# Patient Record
Sex: MALE | Employment: FULL TIME | URBAN - METROPOLITAN AREA
[De-identification: names, ages, dates, MRNs, and addresses within clinical notes are randomized per-mention and may not be internally consistent; named-entity substitution may affect disease eponyms.]

---

## 2019-01-30 ENCOUNTER — PRE VISIT (OUTPATIENT)
Dept: OPHTHALMOLOGY | Facility: CLINIC | Age: 39
End: 2019-01-30

## 2019-01-30 NOTE — TELEPHONE ENCOUNTER
January 30, 2019               Pre-Visit Documentation: Bentley Fisher is a 38 year old male    Chief Complaint   Patient presents with     Previsit     appt w/ Dr Reilly Dale     right lower eye lid           No current outpatient medications on file.       PAST MEDICAL HISTORY: History reviewed. No pertinent past medical history.    PAST SURGICAL HISTORY:   Past Surgical History:   Procedure Laterality Date     APPENDECTOMY OPEN  07/2017     NO HISTORY OF SURGERY         FAMILY HISTORY:   Family History   Problem Relation Age of Onset     Diabetes Other         family history of     Diabetes Mother      Liver Cancer Maternal Grandfather      Lung Cancer Maternal Grandmother        SOCIAL HISTORY:   Social History     Tobacco Use     Smoking status: Never Smoker     Smokeless tobacco: Never Used   Substance Use Topics     Alcohol use: Yes     Comment: gonzalez HERNANDEZ [facility]

## 2019-02-13 ENCOUNTER — OFFICE VISIT (OUTPATIENT)
Dept: OPHTHALMOLOGY | Facility: CLINIC | Age: 39
End: 2019-02-13
Payer: COMMERCIAL

## 2019-02-13 DIAGNOSIS — H00.12 CHALAZION OF RIGHT LOWER EYELID: Primary | ICD-10-CM

## 2019-02-13 PROCEDURE — 99203 OFFICE O/P NEW LOW 30 MIN: CPT | Performed by: OPHTHALMOLOGY

## 2019-02-13 ASSESSMENT — VISUAL ACUITY
OD_CC: 20/20
METHOD: SNELLEN - LINEAR
CORRECTION_TYPE: GLASSES
OS_CC: 20/20

## 2019-02-13 ASSESSMENT — CONF VISUAL FIELD
OS_NORMAL: 1
OD_NORMAL: 1

## 2019-02-13 ASSESSMENT — SLIT LAMP EXAM - LIDS: COMMENTS: NORMAL

## 2019-02-13 NOTE — NURSING NOTE
Patient presents with:  Eye Problem Right Eye: Has a Lump RLL.  Has tried warm compresses and gtt. Last used 2 months ago.      Referring Provider:  No referring provider defined for this encounter.        Sierra Ryan COT

## 2019-02-13 NOTE — PROGRESS NOTES
Chief Complaint(s) and History of Present Illness(es)     Eye Problem Right Eye     Laterality: right eye    Onset: 4 months ago    Associated symptoms: FB sensation and sore to the touch.    Treatments tried: no treatments    Pain scale: 1/10    Comments: Has a Lump RLL.  Has tried warm compresses and gtt. Last used 2   months ago.              Assessment & Plan     Bentley Fisher is a 38 year old male with the following diagnoses:   1. Chalazion of right lower eyelid       return to clinic for incision and drainge - going to work today.        Attending Physician Attestation:  Complete documentation of historical and exam elements from today's encounter can be found in the full encounter summary report (not reduplicated in this progress note).  I personally obtained the chief complaint(s) and history of present illness.  I confirmed and edited as necessary the review of systems, past medical/surgical history, family history, social history, and examination findings as documented by others; and I examined the patient myself.  I personally reviewed the relevant tests, images, and reports as documented above.  I formulated and edited as necessary the assessment and plan and discussed the findings and management plan with the patient and family. - Carlota Grover MD

## 2019-02-13 NOTE — LETTER
2019         RE:  :  MRN: Bentley Fisher  1980  [id number]     Dear Dr. Martinez,    Thank you for asking me to see your patient, Bentley Fisher, for an oculoplastic   consultation.  My assessment and plan are below.  For further details, please see my attached clinic note.           Chief Complaint(s) and History of Present Illness(es)     Eye Problem Right Eye     Laterality: right eye    Onset: 4 months ago    Associated symptoms: FB sensation and sore to the touch.    Treatments tried: no treatments    Pain scale: 1/10    Comments: Has a Lump RLL.  Has tried warm compresses and gtt. Last used 2   months ago.              Assessment & Plan     Bentley Fisher is a 38 year old male with the following diagnoses:   1. Chalazion of right lower eyelid       return to clinic for incision and drainge - going to work today.       Again, thank you for allowing me to participate in the care of your patient.      Sincerely,    Carlota Grover MD  Department of Ophthalmology and Visual Neurosciences  [facility]    CC: Antonio Martinez MD  [facility] [place] [address]  [facility] [address]  VIA Facsimile: [phone]

## 2019-02-27 ENCOUNTER — OFFICE VISIT (OUTPATIENT)
Dept: OPHTHALMOLOGY | Facility: CLINIC | Age: 39
End: 2019-02-27
Payer: COMMERCIAL

## 2019-02-27 DIAGNOSIS — H00.12 CHALAZION OF RIGHT LOWER EYELID: Primary | ICD-10-CM

## 2019-02-27 PROCEDURE — 67800 REMOVE EYELID LESION: CPT | Mod: RT | Performed by: OPHTHALMOLOGY

## 2019-02-27 ASSESSMENT — VISUAL ACUITY
OS_CC: 20/20
OD_CC: 20/20
METHOD: SNELLEN - LINEAR
CORRECTION_TYPE: GLASSES

## 2019-02-27 NOTE — NURSING NOTE
Bentley Fisher's goals for this visit include:  Patient presents with:  Minor Procedure: Right eye I&D of lower lid chalazion      He requests these members of his care team be copied on today's visit information:  PCP: Antonio Martinez    Referring Provider: No referring provider defined for this encounter.            Ayanna PEDERSEN. [facility]

## 2019-02-27 NOTE — PROGRESS NOTES
OPERATIVE NOTE: CHALAZION.    PRE-OPERATIVE DIAGNOSIS: Chalazion right lower lid.    POST-OPERATIVE DIAGNOSIS: Chalazion right lower lid.    PROCEDURE PERFORMED: Incision and drainage of chalazion right lower lid.    SURGEON: Carlota Grover    ASSISTANT: none    ANESTHESIA: Local infiltration with 2% lidocaine with epinephrine.    COMPLICATIONS: None    ESTIMATED BLOOD LOSS:  <5mL    HISTORY AND INDICATIONS: Patient presented with an enlarging chalazion in the involved eyelid.  This failed conservative medical management.  After the risks, benefits and alternatives of the proposed procedure were explained, informed consent was obtained.     PROCEDURE: Patient was brought to the minor procedure room and placed supine on the operating table.  Anesthesia was as listed above.  The area was prepped and draped in the typical fashion.  A chalazion clamp was placed over the involved eyelid and the eyelid everted.  A crutiate incision was made over the chalazion and the lipogranulomatous material removed using the chalazion curette.  Hemostasis was obtained.  The lid was reverted to its normal position, the clamp removed, and the erythromycin antibiotic ointment applied.  The patient tolerated the procedure well and left in stable condition with a tube of antibiotic ointment.     I was present for the entire procedure. Carlota Grover